# Patient Record
Sex: FEMALE | Race: WHITE | Employment: OTHER | URBAN - NONMETROPOLITAN AREA
[De-identification: names, ages, dates, MRNs, and addresses within clinical notes are randomized per-mention and may not be internally consistent; named-entity substitution may affect disease eponyms.]

---

## 2018-04-24 ENCOUNTER — OFFICE VISIT (OUTPATIENT)
Dept: CARDIOLOGY CLINIC | Age: 83
End: 2018-04-24
Payer: MEDICARE

## 2018-04-24 VITALS — DIASTOLIC BLOOD PRESSURE: 74 MMHG | HEART RATE: 72 BPM | WEIGHT: 139.2 LBS | SYSTOLIC BLOOD PRESSURE: 148 MMHG

## 2018-04-24 DIAGNOSIS — G45.8 OTHER SPECIFIED TRANSIENT CEREBRAL ISCHEMIAS: ICD-10-CM

## 2018-04-24 DIAGNOSIS — I10 ESSENTIAL HYPERTENSION: Primary | ICD-10-CM

## 2018-04-24 DIAGNOSIS — E78.01 FAMILIAL HYPERCHOLESTEROLEMIA: ICD-10-CM

## 2018-04-24 DIAGNOSIS — R06.02 SOB (SHORTNESS OF BREATH): ICD-10-CM

## 2018-04-24 PROCEDURE — 4040F PNEUMOC VAC/ADMIN/RCVD: CPT | Performed by: NUCLEAR MEDICINE

## 2018-04-24 PROCEDURE — G8427 DOCREV CUR MEDS BY ELIG CLIN: HCPCS | Performed by: NUCLEAR MEDICINE

## 2018-04-24 PROCEDURE — 1036F TOBACCO NON-USER: CPT | Performed by: NUCLEAR MEDICINE

## 2018-04-24 PROCEDURE — G8421 BMI NOT CALCULATED: HCPCS | Performed by: NUCLEAR MEDICINE

## 2018-04-24 PROCEDURE — 99204 OFFICE O/P NEW MOD 45 MIN: CPT | Performed by: NUCLEAR MEDICINE

## 2018-04-24 PROCEDURE — G8400 PT W/DXA NO RESULTS DOC: HCPCS | Performed by: NUCLEAR MEDICINE

## 2018-04-24 PROCEDURE — 1123F ACP DISCUSS/DSCN MKR DOCD: CPT | Performed by: NUCLEAR MEDICINE

## 2018-04-24 PROCEDURE — 1090F PRES/ABSN URINE INCON ASSESS: CPT | Performed by: NUCLEAR MEDICINE

## 2018-04-24 RX ORDER — CALCIUM CARBONATE 500(1250)
500 TABLET ORAL DAILY
COMMUNITY

## 2018-04-24 RX ORDER — SUCRALFATE 1 G/1
1 TABLET ORAL 2 TIMES DAILY
COMMUNITY

## 2018-04-24 RX ORDER — MONTELUKAST SODIUM 10 MG/1
10 TABLET ORAL NIGHTLY
COMMUNITY
End: 2018-10-23 | Stop reason: ALTCHOICE

## 2018-04-24 ASSESSMENT — ENCOUNTER SYMPTOMS
PHOTOPHOBIA: 0
BACK PAIN: 0
ABDOMINAL DISTENTION: 0
ANAL BLEEDING: 0
VOMITING: 0
ABDOMINAL PAIN: 0
RECTAL PAIN: 0
BLOOD IN STOOL: 0
SHORTNESS OF BREATH: 1
NAUSEA: 0
CONSTIPATION: 0
DIARRHEA: 0
CHEST TIGHTNESS: 1

## 2018-05-01 ENCOUNTER — TELEPHONE (OUTPATIENT)
Dept: CARDIOLOGY CLINIC | Age: 83
End: 2018-05-01

## 2018-05-01 DIAGNOSIS — R94.39 ABNORMAL STRESS TEST: Primary | ICD-10-CM

## 2018-05-04 DIAGNOSIS — I10 ESSENTIAL HYPERTENSION: ICD-10-CM

## 2018-05-04 DIAGNOSIS — E78.01 FAMILIAL HYPERCHOLESTEROLEMIA: ICD-10-CM

## 2018-05-04 DIAGNOSIS — R06.02 SOB (SHORTNESS OF BREATH): ICD-10-CM

## 2018-05-04 DIAGNOSIS — G45.8 OTHER SPECIFIED TRANSIENT CEREBRAL ISCHEMIAS: ICD-10-CM

## 2018-05-16 ENCOUNTER — PREP FOR PROCEDURE (OUTPATIENT)
Dept: CARDIOLOGY | Age: 83
End: 2018-05-16

## 2018-05-16 RX ORDER — ASPIRIN 325 MG
325 TABLET ORAL ONCE
Status: CANCELLED | OUTPATIENT
Start: 2018-05-16 | End: 2018-05-16

## 2018-05-16 RX ORDER — SODIUM CHLORIDE 9 MG/ML
INJECTION, SOLUTION INTRAVENOUS CONTINUOUS
Status: CANCELLED | OUTPATIENT
Start: 2018-05-16

## 2018-05-16 RX ORDER — SODIUM CHLORIDE 0.9 % (FLUSH) 0.9 %
10 SYRINGE (ML) INJECTION PRN
Status: CANCELLED | OUTPATIENT
Start: 2018-05-16

## 2018-05-16 RX ORDER — DIPHENHYDRAMINE HYDROCHLORIDE 50 MG/ML
50 INJECTION INTRAMUSCULAR; INTRAVENOUS ONCE
Status: CANCELLED | OUTPATIENT
Start: 2018-05-16 | End: 2018-05-16

## 2018-05-16 RX ORDER — NITROGLYCERIN 0.4 MG/1
0.4 TABLET SUBLINGUAL EVERY 5 MIN PRN
Status: CANCELLED | OUTPATIENT
Start: 2018-05-16

## 2018-05-16 RX ORDER — SODIUM CHLORIDE 0.9 % (FLUSH) 0.9 %
10 SYRINGE (ML) INJECTION EVERY 12 HOURS SCHEDULED
Status: CANCELLED | OUTPATIENT
Start: 2018-05-16

## 2018-05-17 ENCOUNTER — HOSPITAL ENCOUNTER (OUTPATIENT)
Dept: INPATIENT UNIT | Age: 83
Discharge: HOME | End: 2018-05-17
Attending: NUCLEAR MEDICINE | Admitting: NUCLEAR MEDICINE
Payer: MEDICARE

## 2018-05-17 VITALS
BODY MASS INDEX: 25.86 KG/M2 | HEIGHT: 61 IN | SYSTOLIC BLOOD PRESSURE: 121 MMHG | RESPIRATION RATE: 16 BRPM | DIASTOLIC BLOOD PRESSURE: 60 MMHG | WEIGHT: 137 LBS | HEART RATE: 75 BPM | OXYGEN SATURATION: 98 % | TEMPERATURE: 98 F

## 2018-05-17 LAB
ABO: NORMAL
ANION GAP SERPL CALCULATED.3IONS-SCNC: 13 MEQ/L (ref 8–16)
ANTIBODY SCREEN: NORMAL
BUN BLDV-MCNC: 18 MG/DL (ref 7–22)
CALCIUM SERPL-MCNC: 9.4 MG/DL (ref 8.5–10.5)
CHLORIDE BLD-SCNC: 98 MEQ/L (ref 98–111)
CO2: 27 MEQ/L (ref 23–33)
CREAT SERPL-MCNC: 0.6 MG/DL (ref 0.4–1.2)
EKG ATRIAL RATE: 61 BPM
EKG P AXIS: 61 DEGREES
EKG P-R INTERVAL: 160 MS
EKG Q-T INTERVAL: 444 MS
EKG QRS DURATION: 90 MS
EKG QTC CALCULATION (BAZETT): 446 MS
EKG R AXIS: 17 DEGREES
EKG T AXIS: 16 DEGREES
EKG VENTRICULAR RATE: 61 BPM
GFR SERPL CREATININE-BSD FRML MDRD: > 90 ML/MIN/1.73M2
GLUCOSE BLD-MCNC: 95 MG/DL (ref 70–108)
HCT VFR BLD CALC: 37.7 % (ref 37–47)
HEMOGLOBIN: 12.8 GM/DL (ref 12–16)
MCH RBC QN AUTO: 29.1 PG (ref 27–31)
MCHC RBC AUTO-ENTMCNC: 34 GM/DL (ref 33–37)
MCV RBC AUTO: 85.6 FL (ref 81–99)
PDW BLD-RTO: 14.3 % (ref 11.5–14.5)
PLATELET # BLD: 205 THOU/MM3 (ref 130–400)
PMV BLD AUTO: 7.8 FL (ref 7.4–10.4)
POTASSIUM REFLEX MAGNESIUM: 3.8 MEQ/L (ref 3.5–5.2)
RBC # BLD: 4.4 MILL/MM3 (ref 4.2–5.4)
RH FACTOR: NORMAL
SODIUM BLD-SCNC: 138 MEQ/L (ref 135–145)
WBC # BLD: 5.7 THOU/MM3 (ref 4.8–10.8)

## 2018-05-17 PROCEDURE — 6360000002 HC RX W HCPCS

## 2018-05-17 PROCEDURE — 2500000003 HC RX 250 WO HCPCS

## 2018-05-17 PROCEDURE — 86900 BLOOD TYPING SEROLOGIC ABO: CPT

## 2018-05-17 PROCEDURE — 93458 L HRT ARTERY/VENTRICLE ANGIO: CPT | Performed by: NUCLEAR MEDICINE

## 2018-05-17 PROCEDURE — 2780000010 HC IMPLANT OTHER

## 2018-05-17 PROCEDURE — 80048 BASIC METABOLIC PNL TOTAL CA: CPT

## 2018-05-17 PROCEDURE — 2720000010 HC SURG SUPPLY STERILE

## 2018-05-17 PROCEDURE — 85027 COMPLETE CBC AUTOMATED: CPT

## 2018-05-17 PROCEDURE — 99152 MOD SED SAME PHYS/QHP 5/>YRS: CPT | Performed by: NUCLEAR MEDICINE

## 2018-05-17 PROCEDURE — 36415 COLL VENOUS BLD VENIPUNCTURE: CPT

## 2018-05-17 PROCEDURE — 2580000003 HC RX 258: Performed by: NURSE PRACTITIONER

## 2018-05-17 PROCEDURE — 86901 BLOOD TYPING SEROLOGIC RH(D): CPT

## 2018-05-17 PROCEDURE — C1894 INTRO/SHEATH, NON-LASER: HCPCS

## 2018-05-17 PROCEDURE — C1769 GUIDE WIRE: HCPCS

## 2018-05-17 PROCEDURE — 93005 ELECTROCARDIOGRAM TRACING: CPT | Performed by: NURSE PRACTITIONER

## 2018-05-17 PROCEDURE — 99153 MOD SED SAME PHYS/QHP EA: CPT | Performed by: NUCLEAR MEDICINE

## 2018-05-17 PROCEDURE — 93010 ELECTROCARDIOGRAM REPORT: CPT | Performed by: INTERNAL MEDICINE

## 2018-05-17 PROCEDURE — 86850 RBC ANTIBODY SCREEN: CPT

## 2018-05-17 RX ORDER — SODIUM CHLORIDE 0.9 % (FLUSH) 0.9 %
10 SYRINGE (ML) INJECTION PRN
Status: DISCONTINUED | OUTPATIENT
Start: 2018-05-17 | End: 2018-05-18 | Stop reason: HOSPADM

## 2018-05-17 RX ORDER — SODIUM CHLORIDE 9 MG/ML
INJECTION, SOLUTION INTRAVENOUS CONTINUOUS
Status: DISCONTINUED | OUTPATIENT
Start: 2018-05-17 | End: 2018-05-17 | Stop reason: SDUPTHER

## 2018-05-17 RX ORDER — FENOFIBRATE 67 MG/1
67 CAPSULE ORAL
COMMUNITY
End: 2018-10-23 | Stop reason: ALTCHOICE

## 2018-05-17 RX ORDER — NITROGLYCERIN 0.4 MG/1
0.4 TABLET SUBLINGUAL EVERY 5 MIN PRN
Status: DISCONTINUED | OUTPATIENT
Start: 2018-05-17 | End: 2018-05-18 | Stop reason: HOSPADM

## 2018-05-17 RX ORDER — SODIUM CHLORIDE 0.9 % (FLUSH) 0.9 %
10 SYRINGE (ML) INJECTION EVERY 12 HOURS SCHEDULED
Status: DISCONTINUED | OUTPATIENT
Start: 2018-05-17 | End: 2018-05-18 | Stop reason: HOSPADM

## 2018-05-17 RX ORDER — ATROPINE SULFATE 0.4 MG/ML
0.5 AMPUL (ML) INJECTION
Status: DISCONTINUED | OUTPATIENT
Start: 2018-05-17 | End: 2018-05-18 | Stop reason: HOSPADM

## 2018-05-17 RX ORDER — SODIUM CHLORIDE 9 MG/ML
INJECTION, SOLUTION INTRAVENOUS CONTINUOUS
Status: DISCONTINUED | OUTPATIENT
Start: 2018-05-17 | End: 2018-05-18 | Stop reason: HOSPADM

## 2018-05-17 RX ORDER — SODIUM CHLORIDE 0.9 % (FLUSH) 0.9 %
10 SYRINGE (ML) INJECTION PRN
Status: DISCONTINUED | OUTPATIENT
Start: 2018-05-17 | End: 2018-05-17 | Stop reason: SDUPTHER

## 2018-05-17 RX ORDER — ASPIRIN 325 MG
325 TABLET ORAL ONCE
Status: DISCONTINUED | OUTPATIENT
Start: 2018-05-17 | End: 2018-05-18 | Stop reason: HOSPADM

## 2018-05-17 RX ORDER — ACETAMINOPHEN 325 MG/1
650 TABLET ORAL EVERY 4 HOURS PRN
Status: DISCONTINUED | OUTPATIENT
Start: 2018-05-17 | End: 2018-05-18 | Stop reason: HOSPADM

## 2018-05-17 RX ORDER — SODIUM CHLORIDE 0.9 % (FLUSH) 0.9 %
10 SYRINGE (ML) INJECTION EVERY 12 HOURS SCHEDULED
Status: DISCONTINUED | OUTPATIENT
Start: 2018-05-17 | End: 2018-05-17

## 2018-05-17 RX ORDER — ONDANSETRON 2 MG/ML
4 INJECTION INTRAMUSCULAR; INTRAVENOUS EVERY 6 HOURS PRN
Status: DISCONTINUED | OUTPATIENT
Start: 2018-05-17 | End: 2018-05-18 | Stop reason: HOSPADM

## 2018-05-17 RX ADMIN — SODIUM CHLORIDE: 9 INJECTION, SOLUTION INTRAVENOUS at 13:44

## 2018-05-17 ASSESSMENT — PAIN SCALES - GENERAL
PAINLEVEL_OUTOF10: 0

## 2018-06-04 ENCOUNTER — TELEPHONE (OUTPATIENT)
Dept: CARDIOLOGY CLINIC | Age: 83
End: 2018-06-04

## 2018-06-04 DIAGNOSIS — I10 HYPERTENSION, UNSPECIFIED TYPE: ICD-10-CM

## 2018-06-04 DIAGNOSIS — E78.5 HYPERLIPIDEMIA, UNSPECIFIED HYPERLIPIDEMIA TYPE: Primary | ICD-10-CM

## 2018-10-23 ENCOUNTER — OFFICE VISIT (OUTPATIENT)
Dept: CARDIOLOGY CLINIC | Age: 83
End: 2018-10-23
Payer: MEDICARE

## 2018-10-23 VITALS
HEART RATE: 64 BPM | SYSTOLIC BLOOD PRESSURE: 138 MMHG | WEIGHT: 139.6 LBS | HEIGHT: 61 IN | DIASTOLIC BLOOD PRESSURE: 70 MMHG | BODY MASS INDEX: 26.36 KG/M2

## 2018-10-23 DIAGNOSIS — I25.10 CORONARY ARTERY DISEASE INVOLVING NATIVE CORONARY ARTERY OF NATIVE HEART WITHOUT ANGINA PECTORIS: Primary | ICD-10-CM

## 2018-10-23 DIAGNOSIS — I10 ESSENTIAL HYPERTENSION: ICD-10-CM

## 2018-10-23 DIAGNOSIS — E78.01 FAMILIAL HYPERCHOLESTEROLEMIA: ICD-10-CM

## 2018-10-23 PROCEDURE — G8598 ASA/ANTIPLAT THER USED: HCPCS | Performed by: NUCLEAR MEDICINE

## 2018-10-23 PROCEDURE — G8400 PT W/DXA NO RESULTS DOC: HCPCS | Performed by: NUCLEAR MEDICINE

## 2018-10-23 PROCEDURE — G8419 CALC BMI OUT NRM PARAM NOF/U: HCPCS | Performed by: NUCLEAR MEDICINE

## 2018-10-23 PROCEDURE — 1036F TOBACCO NON-USER: CPT | Performed by: NUCLEAR MEDICINE

## 2018-10-23 PROCEDURE — G8484 FLU IMMUNIZE NO ADMIN: HCPCS | Performed by: NUCLEAR MEDICINE

## 2018-10-23 PROCEDURE — 1090F PRES/ABSN URINE INCON ASSESS: CPT | Performed by: NUCLEAR MEDICINE

## 2018-10-23 PROCEDURE — 1123F ACP DISCUSS/DSCN MKR DOCD: CPT | Performed by: NUCLEAR MEDICINE

## 2018-10-23 PROCEDURE — 99213 OFFICE O/P EST LOW 20 MIN: CPT | Performed by: NUCLEAR MEDICINE

## 2018-10-23 PROCEDURE — 4040F PNEUMOC VAC/ADMIN/RCVD: CPT | Performed by: NUCLEAR MEDICINE

## 2018-10-23 PROCEDURE — G8427 DOCREV CUR MEDS BY ELIG CLIN: HCPCS | Performed by: NUCLEAR MEDICINE

## 2018-10-23 PROCEDURE — 1101F PT FALLS ASSESS-DOCD LE1/YR: CPT | Performed by: NUCLEAR MEDICINE

## 2018-10-23 RX ORDER — ROSUVASTATIN CALCIUM 5 MG/1
5 TABLET, COATED ORAL
COMMUNITY
Start: 2018-06-08 | End: 2018-10-23 | Stop reason: ALTCHOICE

## 2018-10-23 NOTE — PROGRESS NOTES
[address] [address]  Dept: [phone]  Dept Fax: [phone]  Loc: [phone]    Visit Date: 10/23/2018    Disha Rose is a 81 y.o. female who presents todayfor:  Chief Complaint   Patient presents with    6 Month Follow-Up    Hypertension    Shortness of Breath    Hyperlipidemia     Cath done  Minimal CAD  No chest pain  Aches and pains  Changed to crestor   BP is stable   No cath complications      HPI:  HPI  Past Medical History:   Diagnosis Date    Cerebral artery occlusion with cerebral infarction ([facility] [address]     Chronic kidney disease, stage II (mild)     GERD (gastroesophageal reflux disease)     Hyperlipidemia     Hypertension     Hypokalemia     TIA (transient ischemic attack) 2008    Type II or unspecified type diabetes mellitus without mention of complication, not stated as uncontrolled       Past Surgical History:   Procedure Laterality Date    CHOLECYSTECTOMY      HERNIA REPAIR      HYSTERECTOMY       Family History   Problem Relation Age of Onset    Diabetes Mother      Social History   Substance Use Topics    Smoking status: Former Smoker     Quit date: 12/11/1986    Smokeless tobacco: Never Used    Alcohol use 0.6 oz/week     1 Glasses of wine per week      Comment: daily       Current Outpatient Prescriptions   Medication Sig Dispense Refill    sucralfate (CARAFATE) 1 GM tablet Take 1 g by mouth 2 times daily      calcium carbonate (OSCAL) 500 MG TABS tablet Take 500 mg by mouth daily      Cranberry 1000 MG CAPS Take by mouth      aspirin 81 MG EC tablet Take 81 mg by mouth daily.  omeprazole (PRILOSEC) 40 MG capsule Take 20 mg by mouth daily       hydrochlorothiazide (MICROZIDE) 12.5 MG capsule Take 12.5 mg by mouth every morning       metoprolol (LOPRESSOR) 50 MG tablet Take 50 mg by mouth 2 times daily.  amLODIPine (NORVASC) 5 MG tablet Take 5 mg by mouth daily.      

## 2021-10-11 ENCOUNTER — HOSPITAL ENCOUNTER (OUTPATIENT)
Dept: WOMENS IMAGING | Age: 86
Discharge: HOME | End: 2021-10-11

## 2021-10-11 DIAGNOSIS — Z00.6 ENCOUNTER FOR EXAMINATION FOR NORMAL COMPARISON OR CONTROL IN CLINICAL RESEARCH PROGRAM: ICD-10-CM

## 2021-10-20 ENCOUNTER — HOSPITAL ENCOUNTER (OUTPATIENT)
Dept: WOMENS IMAGING | Age: 86
Discharge: HOME | End: 2021-10-20
Payer: MEDICARE

## 2021-10-20 DIAGNOSIS — N64.52 NIPPLE DISCHARGE: ICD-10-CM

## 2021-10-20 DIAGNOSIS — N64.52 BLOODY DISCHARGE FROM NIPPLE: ICD-10-CM

## 2021-10-20 PROCEDURE — 76642 ULTRASOUND BREAST LIMITED: CPT

## 2021-11-01 ENCOUNTER — HOSPITAL ENCOUNTER (OUTPATIENT)
Dept: WOMENS IMAGING | Age: 86
Discharge: HOME | End: 2021-11-01
Payer: MEDICARE

## 2021-11-01 DIAGNOSIS — N63.42 SUBAREOLAR MASS OF LEFT BREAST: ICD-10-CM

## 2021-11-01 PROCEDURE — C1894 INTRO/SHEATH, NON-LASER: HCPCS

## 2021-11-01 PROCEDURE — 88342 IMHCHEM/IMCYTCHM 1ST ANTB: CPT

## 2021-11-01 PROCEDURE — 88341 IMHCHEM/IMCYTCHM EA ADD ANTB: CPT

## 2021-11-01 PROCEDURE — 77065 DX MAMMO INCL CAD UNI: CPT

## 2021-11-01 PROCEDURE — 88305 TISSUE EXAM BY PATHOLOGIST: CPT

## 2021-11-01 NOTE — PROGRESS NOTES
[facility] [address]  Pre-Biopsy Assessment      Patient Education    Written information about procedure Yes  left X2   Procedural steps explained Yes Ultrasound Biopsy   Post-op potential: bruising, hematoma, pain Yes    Self-care: activity, care of dressing Yes    Patient verbalized understanding Yes    Consent signed and witnessed Yes      Hormone Therapy Status: n/a    Recent Medication: Other, Plavix and 81 mg aspirin Last Dose: 10-29-21                                     Hormone Replacement Therapy: no    Previous Breast Biopsy: no    Previous Diagnosis Cancer: no    Hysterectomy:yes - age 38    Emotional Status: Calm    Language or Physical Barriers: n/a    Comments: n/a      Electronically signed by Claudette Rincon on 11/1/2021 at 1:24 PM

## 2021-11-01 NOTE — PROGRESS NOTES
Breast Biopsy Flowsheet/Post-Operative Care    Date of Procedure: 11/1/2021  Physician: Dr. Byron Choudhury  Technologist: Iris Vicente guided breast biopsy X2  Lesion type: Non-palpable  Breast: left    Clock face position: Site #1: nipple 12:00 barbell clip     Site #2: nipple 12:00 U clilp      Primary Method of Detection:Palpable      Microcalcification's: no   Distribution: N/A        Biopsy Method:   Sertera:    Site # 1    Gauge: 14    # of Passes: 5     Clip: Barbell    Sertera:    Site # 2    Gauge: 14    # of Passes: 4     Clip:  U        Pre-Op Assessment: (BI-RADS)   4. Suspicious Abnormality    Patient Tolerated Procedure: good  Complications: n/a  Comments: n/a    Post Operative Care  Steri strips: Yes  Dressing: Gauze, Tape   Ice Applied to Site:  No  Evidence of Bleeding:  No    Pain Verbalized: No      Written Discharge Instructions: Yes  Condition at Discharge: good  Time of Discharge: 1425    Electronically signed by Claudette Rincon on 11/1/2021 at 3:47 PM

## 2021-11-03 ENCOUNTER — CLINICAL DOCUMENTATION (OUTPATIENT)
Dept: WOMENS IMAGING | Age: 86
End: 2021-11-03

## 2021-11-15 ENCOUNTER — OFFICE VISIT (OUTPATIENT)
Dept: SURGERY | Age: 86
End: 2021-11-15
Payer: MEDICARE

## 2021-11-15 ENCOUNTER — TELEPHONE (OUTPATIENT)
Dept: SURGERY | Age: 86
End: 2021-11-15

## 2021-11-15 VITALS
TEMPERATURE: 97 F | HEIGHT: 61 IN | HEART RATE: 67 BPM | DIASTOLIC BLOOD PRESSURE: 67 MMHG | WEIGHT: 138 LBS | RESPIRATION RATE: 15 BRPM | BODY MASS INDEX: 26.06 KG/M2 | OXYGEN SATURATION: 97 % | SYSTOLIC BLOOD PRESSURE: 122 MMHG

## 2021-11-15 DIAGNOSIS — D24.2 PAPILLOMA OF LEFT BREAST: Primary | ICD-10-CM

## 2021-11-15 DIAGNOSIS — Z01.818 PRE-OP TESTING: ICD-10-CM

## 2021-11-15 PROCEDURE — 1123F ACP DISCUSS/DSCN MKR DOCD: CPT | Performed by: SURGERY

## 2021-11-15 PROCEDURE — G8400 PT W/DXA NO RESULTS DOC: HCPCS | Performed by: SURGERY

## 2021-11-15 PROCEDURE — G8427 DOCREV CUR MEDS BY ELIG CLIN: HCPCS | Performed by: SURGERY

## 2021-11-15 PROCEDURE — 4040F PNEUMOC VAC/ADMIN/RCVD: CPT | Performed by: SURGERY

## 2021-11-15 PROCEDURE — 1090F PRES/ABSN URINE INCON ASSESS: CPT | Performed by: SURGERY

## 2021-11-15 PROCEDURE — G8417 CALC BMI ABV UP PARAM F/U: HCPCS | Performed by: SURGERY

## 2021-11-15 PROCEDURE — 1036F TOBACCO NON-USER: CPT | Performed by: SURGERY

## 2021-11-15 PROCEDURE — 99203 OFFICE O/P NEW LOW 30 MIN: CPT | Performed by: SURGERY

## 2021-11-15 PROCEDURE — G8484 FLU IMMUNIZE NO ADMIN: HCPCS | Performed by: SURGERY

## 2021-11-15 RX ORDER — MULTIVIT WITH MINERALS/LUTEIN
250 TABLET ORAL DAILY
COMMUNITY

## 2021-11-15 RX ORDER — MULTIVIT-MIN/IRON/FOLIC ACID/K 18-600-40
CAPSULE ORAL
Status: ON HOLD | COMMUNITY
End: 2021-12-09

## 2021-11-15 ASSESSMENT — ENCOUNTER SYMPTOMS
CHOKING: 0
COUGH: 0
DIARRHEA: 0
ANAL BLEEDING: 0
PHOTOPHOBIA: 0
EYE PAIN: 0
EYE REDNESS: 0
EYE DISCHARGE: 0
RECTAL PAIN: 0
WHEEZING: 0
VOICE CHANGE: 0
COLOR CHANGE: 0
VOMITING: 0
CONSTIPATION: 0
CHEST TIGHTNESS: 0
SINUS PAIN: 0
SORE THROAT: 0
FACIAL SWELLING: 0
STRIDOR: 0
BLOOD IN STOOL: 0
RHINORRHEA: 0
SINUS PRESSURE: 0
SHORTNESS OF BREATH: 0
EYE ITCHING: 0
ABDOMINAL PAIN: 0
ABDOMINAL DISTENTION: 0
APNEA: 0
NAUSEA: 0
TROUBLE SWALLOWING: 0
BACK PAIN: 0

## 2021-11-15 NOTE — LETTER
[facility]  Wally Delgado MD FACS  Phone- [phone]  Fax [phone]    Pt Name: Celine Blizzard  Medical Record Number: [id number]  Date of Birth 1935   Today's Date: 11/15/2021    Lorna Julian was evaluated in the office today. My assessment and plans are listed below. Assessment:     Elsie Granados was seen today for surgical consult. Diagnoses and all orders for this visit:    Papilloma of left breast  -     US PLACE BREAST LOC DEVICE 1ST LESION LEFT; Future  -     RODGER DIGITAL DIAGNOSTIC W OR WO CAD LEFT; Future  -     EXCISION BREAST LESION W/ PREOP NEEDLE LOC; Future  -     EKG 12 Lead; Future    Pre-op testing  -     EKG 12 Lead; Future         Plan:  1. Schedule Elsie Granados for   1. Ultrasound needle localization of barbell clip  2. Excisional biopsy left breast lesion  2. Status: outpatient  3. Planned anesthesia: MAC  4. We did have a long discussion in the office regarding whether to proceed or not. I did discuss with her that the recommendations for papillary lesions are excision although it is extremely unlikely to find anything further at this site. She had questions about the fact that she is 81 and what the chances were and I did explain to her that certainly from a medical standpoint she does not look 81 and I anticipate that she would live at least 5 years and again the chances are very small that this would be anything or turned to anything. She did then go on to express that she did lose sleep over this since she had a sister who had a bilateral mastectomy she did not want to go to anything like that so after considering this she does want to proceed  5. She will undergo pre-operative clearance per anesthesia guidelines with risk factors listed under the past medical history diagnosis & problem list.  6. Perioperative discontinuation of ASA, Plavix, warfarin, Brillinta, Effient, Pradaxa, Eliquis and Xarelto.  Continuation of 81 mg Aspirin is acceptable. 7. Perioperative medical clearance is not required  Orders Placed This Encounter:  Orders Placed This Encounter   Procedures    EXCISION BREAST LESION W/ PREOP NEEDLE LOC     Standing Status:   Future     Standing Expiration Date:   11/15/2022     Order Specific Question:   Pre-procedure Diagnosis     Answer:   LEFT BREAST MASS    US PLACE BREAST LOC DEVICE 1ST LESION LEFT     Standing Status:   Future     Standing Expiration Date:   11/15/2022    RODGER DIGITAL DIAGNOSTIC W OR WO CAD LEFT     Standing Status:   Future     Standing Expiration Date:   1/15/2023    EKG 12 Lead     Standing Status:   Future     Standing Expiration Date:   11/15/2022     Order Specific Question:   Reason for Exam?     Answer:   Pre-op   8. If I can provide any additional assistance or you have any concerns, please feel free to contact me. Thank you for allowing to participate in the care of your patients. Sincerely,      Evelia Davila MD FACS  [address]  [place], [facility]  Office: [phone]  Fax: [phone]

## 2021-11-15 NOTE — PROGRESS NOTES
Subjective:      Patient ID: Noemi Yang is a 81 y.o. female. Chief Complaint   Patient presents with    Surgical Consult     new patient--ref by [facility] for left breast papilloma-needs excision       HPI  /67 (Site: Right Upper Arm, Position: Sitting, Cuff Size: Medium Adult)   Pulse 67   Temp 97 °F (36.1 °C) (Tympanic)   Resp 15   Ht 5' 1\" (1.549 m)   Wt 138 lb (62.6 kg)   SpO2 97%   BMI 26.07 kg/m²     Review of Systems   Constitutional: Negative for activity change, appetite change, chills, diaphoresis, fatigue, fever and unexpected weight change. HENT: Negative for congestion, dental problem, drooling, ear discharge, ear pain, facial swelling, hearing loss, mouth sores, nosebleeds, postnasal drip, rhinorrhea, sinus pressure, sinus pain, sneezing, sore throat, tinnitus, trouble swallowing and voice change. Eyes: Negative for photophobia, pain, discharge, redness, itching and visual disturbance. Respiratory: Negative for apnea, cough, choking, chest tightness, shortness of breath, wheezing and stridor. Cardiovascular: Negative for chest pain, palpitations and leg swelling. Gastrointestinal: Negative for abdominal distention, abdominal pain, anal bleeding, blood in stool, constipation, diarrhea, nausea, rectal pain and vomiting. Endocrine: Negative for cold intolerance, heat intolerance, polydipsia, polyphagia and polyuria. Genitourinary: Negative for decreased urine volume, difficulty urinating, dyspareunia, dysuria, enuresis, flank pain, frequency, genital sores, hematuria, menstrual problem, pelvic pain, urgency, vaginal bleeding, vaginal discharge and vaginal pain. Musculoskeletal: Negative for arthralgias, back pain, gait problem, joint swelling, myalgias, neck pain and neck stiffness. Skin: Negative for color change, pallor, rash and wound. Allergic/Immunologic: Negative for environmental allergies, food allergies and immunocompromised state.    Neurological: Negative for dizziness, tremors, seizures, syncope, facial asymmetry, speech difficulty, weakness, light-headedness, numbness and headaches. Hematological: Negative for adenopathy. Does not bruise/bleed easily. Psychiatric/Behavioral: Negative for agitation, behavioral problems, confusion, decreased concentration, dysphoric mood, hallucinations, self-injury, sleep disturbance and suicidal ideas. The patient is not nervous/anxious and is not hyperactive.         Objective:   Physical Exam    Assessment:            Plan:              Alisa West

## 2021-11-15 NOTE — TELEPHONE ENCOUNTER
[address]    Phone * [phone]   Surgical Scheduling Direct line Phone * [phone]  Fax * [phone]      Nai Mensahcarolyn      1935    female    [place] [address]   Marital Status:         Home Phone: [phone]   Cell Phone:   Telephone Information:   Mobile [phone]              Surgeon: Dr. Sena Cavazos  Surgery Date:12-   Time: CATALINA David     Procedure: Left Lumpectomy with ultrasound guided preop needle localization  Outpatient     Diagnosis: left breast pappiloma    Important Medical History: In Epic    Special Inst/Equip:     CPT Codes: 38516    Latex Allergy:   no Cardiac Device:  no    Anesthesia Type: MAC    Case Location:  Main OR     Preadmission Testing: Phone Call      PAT Date and Time: ________________________________    PAT Confirmation #: _________________________________    Post Op Visit:  ______________________________________    Need Preop Cardiac Clearance:   no    Does patient have Cardiologist/physician?  No      Surgery Conformation #:  ______________________________________________    : __________________________________ Date:____________________        [employer] Name:  Luis Enrique Stone

## 2021-11-15 NOTE — PROGRESS NOTES
Montserrat Ruff MD [facility]  General Surgery  New Patient Evaluation in Office  Pt Name: Heide Delgado  Date of Birth 1935   Today's Date: 11/15/2021  Medical Record Number: [id number]  Referring Provider: No ref. provider found  Primary Care Provider: Rosalva Augustinnsrodney  Chief Complaint   Patient presents with    Surgical Consult     new patient--ref by [facility] for left breast papilloma-needs excision     ASSESSMENT      Problem List Items Addressed This Visit     Papilloma of left breast - Primary    Relevant Orders    US PLACE BREAST LOC DEVICE 1ST LESION LEFT    RODGER DIGITAL DIAGNOSTIC W OR WO CAD LEFT    EXCISION BREAST LESION W/ PREOP NEEDLE LOC    EKG 12 Lead      Other Visit Diagnoses     Pre-op testing        Relevant Orders    EKG 12 Lead        Past Medical History:   Diagnosis Date    GERD (gastroesophageal reflux disease)     Hiatal hernia     Hyperlipidemia     Hypertension     Hypokalemia     Papilloma of left breast     TIA (transient ischemic attack) 2008          PLANS      1. Schedule Kirk Denver for   1. Ultrasound needle localization of barbell clip  2. Excisional biopsy left breast lesion  2. Status: outpatient  3. Planned anesthesia: MAC  4. We did have a long discussion in the office regarding whether to proceed or not. I did discuss with her that the recommendations for papillary lesions are excision although it is extremely unlikely to find anything further at this site. She had questions about the fact that she is 81 and what the chances were and I did explain to her that certainly from a medical standpoint she does not look 81 and I anticipate that she would live at least 5 years and again the chances are very small that this would be anything or turned to anything. She did then go on to express that she did lose sleep over this since she had a sister who had a bilateral mastectomy she did not want to go to anything like that so after considering this she does want to proceed  5.  She will undergo pre-operative clearance per anesthesia guidelines with risk factors listed under the past medical history diagnosis & problem list.  6. Perioperative discontinuation of ASA, Plavix, warfarin, Brillinta, Effient, Pradaxa, Eliquis and Xarelto. Continuation of 81 mg Aspirin is acceptable. 7. Perioperative medical clearance is not required  Orders Placed This Encounter:  Orders Placed This Encounter   Procedures    EXCISION BREAST LESION W/ PREOP NEEDLE LOC     Standing Status:   Future     Standing Expiration Date:   11/15/2022     Order Specific Question:   Pre-procedure Diagnosis     Answer:   LEFT BREAST MASS    US PLACE BREAST LOC DEVICE 1ST LESION LEFT     Standing Status:   Future     Standing Expiration Date:   11/15/2022    RODGER DIGITAL DIAGNOSTIC W OR WO CAD LEFT     Standing Status:   Future     Standing Expiration Date:   1/15/2023    EKG 12 Lead     Standing Status:   Future     Standing Expiration Date:   11/15/2022     Order Specific Question:   Reason for Exam?     Answer:   Pre-op   8. Leah Garrison is a 81 y.o.  female seen in the office for evaluation of bloody nipple discharge left breast and ultrasound biopsy of papillary lesion. She presented with bloody liquid nipple discharge left breast.  She was referred into women's wellness for a galactogram which could not be completed successfully and an ultrasound was performed which revealed to small retroareolar lesions. Both of these were biopsied and one returned sclerosed intraductal papilloma and the other was just fibrosis. The lesion with the barbell clip is the intraductal papilloma although they are very close. The patient did say that since her biopsy she has had no more nipple discharge  Narrative   LOCATION: [place]       PROCEDURE: US BREAST LIMITED LEFT       CLINICAL INFORMATION: Nipple discharge . Bloody left nipple discharge.       COMPARISON: 10/4/2021 and 5/24/2019.  These are outside images from joint [facility].       Initially, a left breast galactogram was attempted. Informed signed consent was obtained from the patient. The left nipple was prepped and draped in standard sterile fashion. A small amount of bloody discharge could be seen. This is at 12:00 on the    nipple. A 31-gauge cannula was utilized. Despite multiple attempts, the discharging duct could not be cannulated. A galactogram could not be performed.       TECHNIQUE: Targeted ultrasound of the left breast was performed. Grayscale images and color images of the real-time examination were reviewed. The axilla was also imaged.       FINDINGS:        The retroareolar area was imaged. There is a 5 x 2 x 4 mm oval nodule at 12:00 just superior to the nipple. There is no associated color flow. Adjacent to this, there is a 2nd oval hypoechoic nodule measuring 7 x 2 x 7 mm.       There are no sonographic abnormalities in the axilla. A normal lymph node is seen.                Impression   2 retroareolar/upper central left breast nodules. These are posterior to the discharging duct. An ultrasound-guided biopsy of each of these is recommended.       An ultrasound-guided biopsy is recommended.       These results were discussed with the patient. The tech navigator was notified. We will arrange scheduling the patient for her procedure per department protocol.                   BI-RADS CATEGORY 4B - Intermediate suspicion for malignancy           FINAL DIAGNOSIS:   A. Left breast, 12:00, U clip, core biopsy:             Dense fibrous tissue, negative for malignancy. D. Left breast mass, 12:00, barbell clip, core biopsy:             Partially disrupted sclerosed papilloma.  See microscopic    Past Medical History  Past Medical History:   Diagnosis Date    GERD (gastroesophageal reflux disease)     Hiatal hernia     Hyperlipidemia     Hypertension     Hypokalemia     Papilloma of left breast     TIA (transient ischemic attack) 2008 Past Surgical History  Past Surgical History:   Procedure Laterality Date    CHOLECYSTECTOMY  1993    COLONOSCOPY  2010    Dr. Donna Ferrell    age 38   [address] LEFT Left 11/01/2021    RODGER [address] LEFT 11/1/2021 Melody Lowe MD [facility] OVARY REMOVAL Bilateral 1993    age 38    [address] LEFT Left 11/01/2021     BREAST BIOPSY NEEDLE ADDITIONAL LEFT 11/1/2021 Melody Lowe MD [facility]       Medications  Current Outpatient Medications on File Prior to Visit   Medication Sig Dispense Refill    Ascorbic Acid (VITAMIN C) 250 MG tablet Take 250 mg by mouth daily      Cholecalciferol (VITAMIN D) 50 MCG (2000 UT) CAPS capsule Take by mouth      sucralfate (CARAFATE) 1 GM tablet Take 1 g by mouth 2 times daily      calcium carbonate (OSCAL) 500 MG TABS tablet Take 500 mg by mouth daily      aspirin 81 MG EC tablet Take 81 mg by mouth daily.  omeprazole (PRILOSEC) 40 MG capsule Take 20 mg by mouth daily       hydrochlorothiazide (MICROZIDE) 12.5 MG capsule Take 12.5 mg by mouth every morning       metoprolol (LOPRESSOR) 50 MG tablet Take 50 mg by mouth 2 times daily.  amLODIPine (NORVASC) 5 MG tablet Take 5 mg by mouth daily.  clopidogrel (PLAVIX) 75 MG tablet Take 75 mg by mouth daily.  Multiple Vitamins-Minerals (MULTIVITAMIN PO) Take  by mouth daily.  Cranberry 1000 MG CAPS Take by mouth (Patient not taking: Reported on 11/15/2021)       No current facility-administered medications on file prior to visit.      Allergies  Allergies   Allergen Reactions    Bicillin [Penicillin G Benzathine]        Family History  Family History   Problem Relation Age of Onset    Diabetes Mother     Cancer Mother         unsure of primary    Breast Cancer Daughter 62        bilateral    Villarreal Cancer Maternal Grandmother         female cancer mets to liver Social History  Social History     Socioeconomic History    Marital status:      Spouse name: Not on file    Number of children: Not on file    Years of education: Not on file    Highest education level: Not on file   Occupational History    Not on file   Tobacco Use    Smoking status: Former Smoker     Quit date: 1986     Years since quittin.9    Smokeless tobacco: Never Used   Vaping Use    Vaping Use: Never used   Substance and Sexual Activity    Alcohol use: Yes     Alcohol/week: 1.0 standard drink     Types: 1 Glasses of wine per week     Comment: daily     Drug use: No    Sexual activity: Not on file   Other Topics Concern    Not on file   Social History Narrative    Not on file     Social Determinants of Health     Financial Resource Strain:     Difficulty of Paying Living Expenses: Not on file   Food Insecurity:     Worried About Running Out of Food in the Last Year: Not on file    Kevin of Food in the Last Year: Not on file   Transportation Needs:     Lack of Transportation (Medical): Not on file    Lack of Transportation (Non-Medical):  Not on file   Physical Activity:     Days of Exercise per Week: Not on file    Minutes of Exercise per Session: Not on file   Stress:     Feeling of Stress : Not on file   Social Connections:     Frequency of Communication with Friends and Family: Not on file    Frequency of Social Gatherings with Friends and Family: Not on file    Attends Jainism Services: Not on file    Active Member of Clubs or Organizations: Not on file    Attends Club or Organization Meetings: Not on file    Marital Status: Not on file   Intimate Partner Violence:     Fear of Current or Ex-Partner: Not on file    Emotionally Abused: Not on file    Physically Abused: Not on file    Sexually Abused: Not on file   Housing Stability:     Unable to Pay for Housing in the Last Year: Not on file    Number of [place] in the Last Year: Not on file    Unstable Housing in the Last Year: Not on file       [address] Maintenance   Topic Date Due    COVID-19 Vaccine (1) Never done    DTaP/Tdap/Td vaccine (1 - Tdap) Never done    DEXA (modify frequency per FRAX score)  Never done    Shingles Vaccine (2 of 3) 09/28/2017    Potassium monitoring  05/17/2019    Creatinine monitoring  05/17/2019    Annual Wellness Visit (AWV)  Never done    Flu vaccine (1) 09/01/2021    Pneumococcal 65+ years Vaccine  Completed    Hepatitis A vaccine  Aged Out    Hepatitis B vaccine  Aged Out    Hib vaccine  Aged Out    Meningococcal (ACWY) vaccine  Aged Out       Review of Systems  Constitutional: Negative for activity change, appetite change, chills, diaphoresis, fatigue, fever and unexpected weight change. HENT: Negative for congestion, dental problem, drooling, ear discharge, ear pain, facial swelling, hearing loss, mouth sores, nosebleeds, postnasal drip, rhinorrhea, sinus pressure, sinus pain, sneezing, sore throat, tinnitus, trouble swallowing and voice change. Eyes: Negative for photophobia, pain, discharge, redness, itching and visual disturbance. Respiratory: Negative for apnea, cough, choking, chest tightness, shortness of breath, wheezing and stridor. Cardiovascular: Negative for chest pain, palpitations and leg swelling. Gastrointestinal: Negative for abdominal distention, abdominal pain, anal bleeding, blood in stool, constipation, diarrhea, nausea, rectal pain and vomiting. Endocrine: Negative for cold intolerance, heat intolerance, polydipsia, polyphagia and polyuria. Genitourinary: Negative for decreased urine volume, difficulty urinating, dyspareunia, dysuria, enuresis, flank pain, frequency, genital sores, hematuria, menstrual problem, pelvic pain, urgency, vaginal bleeding, vaginal discharge and vaginal pain.    Musculoskeletal: Negative for arthralgias, back pain, gait problem, joint swelling, myalgias, neck pain and neck Normal S1 and S2. . Carotid and femoral pulses 2+/4 and equal bilaterally. ABDOMEN: Normal shape. Hysterectomy and cholecystectomy scar(s) present. Normal bowel sounds. No bruits. Elizabeth Raddle \"soft, nontender, nondistended, no masses or organomegaly. no evidence of hernia. Percussion: Normal without hepatosplenomegally. Tenderness: absent. RECTAL: deferred, not clinically indicated  NEUROLOGIC: There are no focalizing motor or sensory deficits. CN II-XII are grossly intact. [facility] EXTREMITIES: no cyanosis, no clubbing and no edema.                 Electronically signed by Danika Britt MD on 11/15/2021 at 5:04 PM

## 2021-11-15 NOTE — TELEPHONE ENCOUNTER
Patient scheduled for surgery with Dr. Ranulfo Park on 12- at 11:30am with an arrival of 8:30am at [address] [place] [address]. Preop orders and instructions given to patient. Surgery consent signed. Antibacterial soap given. Patient having EKG done at [facility]. Okay to hold Plavix 5-day per [facility] at Dr. Alfred Wolf office.

## 2021-11-16 ENCOUNTER — PREP FOR PROCEDURE (OUTPATIENT)
Dept: SURGERY | Age: 86
End: 2021-11-16

## 2021-12-08 NOTE — H&P
Obinna Vieyra MD [facility]  General Surgery  H and P for Surgery   Pt Name: Odilon Metcalf  Date of Birth 1935   Today's Date: 12/8/2021  Medical Record Number: [id number]  Referring Provider: No ref. provider found  Primary Care Provider: Leonides Paige  No chief complaint on file. ASSESSMENT      Problem List Items Addressed This Visit     Patient Active Problem List   Diagnosis Code    Chronic kidney disease, stage II (mild) N18.2    Hyperlipidemia E78.5    Hypertension I10    Hypokalemia E87.6    Proteinuria R80.9    Abnormal stress test R94.39    Papilloma of left breast D24.2            Past Medical History:   Diagnosis Date    GERD (gastroesophageal reflux disease)     Hiatal hernia     Hyperlipidemia     Hypertension     Hypokalemia     Papilloma of left breast     TIA (transient ischemic attack) 2008          PLANS      1. Schedule Lisfiona Purdyk for   1. Ultrasound needle localization of barbell clip  2. Excisional biopsy left breast lesion  2. Status: outpatient  3. Planned anesthesia: MAC  4. We did have a long discussion in the office regarding whether to proceed or not. I did discuss with her that the recommendations for papillary lesions are excision although it is extremely unlikely to find anything further at this site. She had questions about the fact that she is 81 and what the chances were and I did explain to her that certainly from a medical standpoint she does not look 81 and I anticipate that she would live at least 5 years and again the chances are very small that this would be anything or turned to anything. She did then go on to express that she did lose sleep over this since she had a sister who had a bilateral mastectomy she did not want to go to anything like that so after considering this she does want to proceed  5.  She will undergo pre-operative clearance per anesthesia guidelines with risk factors listed under the past medical history diagnosis & problem list.  6. Perioperative discontinuation of ASA, Plavix, warfarin, Brillinta, Effient, Pradaxa, Eliquis and Xarelto. Continuation of 81 mg Aspirin is acceptable. 7. Perioperative medical clearance is not required  Orders Placed This Encounter:  No orders of the defined types were placed in this encounter. 8.       JEFE Rogers is a 81 y.o.  female seen in the office for evaluation of bloody nipple discharge left breast and ultrasound biopsy of papillary lesion. She presented with bloody liquid nipple discharge left breast.  She was referred into women's wellness for a galactogram which could not be completed successfully and an ultrasound was performed which revealed to small retroareolar lesions. Both of these were biopsied and one returned sclerosed intraductal papilloma and the other was just fibrosis. The lesion with the barbell clip is the intraductal papilloma although they are very close. The patient did say that since her biopsy she has had no more nipple discharge  Narrative   LOCATION: [place]       PROCEDURE: US BREAST LIMITED LEFT       CLINICAL INFORMATION: Nipple discharge . Bloody left nipple discharge.       COMPARISON: 10/4/2021 and 5/24/2019. These are outside images from [facility].       Initially, a left breast galactogram was attempted. Informed signed consent was obtained from the patient. The left nipple was prepped and draped in standard sterile fashion. A small amount of bloody discharge could be seen. This is at 12:00 on the    nipple. A 31-gauge cannula was utilized. Despite multiple attempts, the discharging duct could not be cannulated. A galactogram could not be performed.       TECHNIQUE: Targeted ultrasound of the left breast was performed. Grayscale images and color images of the real-time examination were reviewed. The axilla was also imaged.       FINDINGS:        The retroareolar area was imaged.  There is a 5 x 2 x 4 mm oval nodule at 12:00 just superior to the nipple. There is no associated color flow. Adjacent to this, there is a 2nd oval hypoechoic nodule measuring 7 x 2 x 7 mm.       There are no sonographic abnormalities in the axilla. A normal lymph node is seen.                Impression   2 retroareolar/upper central left breast nodules. These are posterior to the discharging duct. An ultrasound-guided biopsy of each of these is recommended.       An ultrasound-guided biopsy is recommended.       These results were discussed with the patient. The tech navigator was notified. We will arrange scheduling the patient for her procedure per department protocol.                   BI-RADS CATEGORY 4B - Intermediate suspicion for malignancy           FINAL DIAGNOSIS:   A. Left breast, 12:00, U clip, core biopsy:             Dense fibrous tissue, negative for malignancy. D. Left breast mass, 12:00, barbell clip, core biopsy:             Partially disrupted sclerosed papilloma. See microscopic    Past Medical History  Past Medical History:   Diagnosis Date    GERD (gastroesophageal reflux disease)     Hiatal hernia     Hyperlipidemia     Hypertension     Hypokalemia     Papilloma of left breast     TIA (transient ischemic attack) 2008       Past Surgical History  Past Surgical History:   Procedure Laterality Date   Sybil Jefferson  2010    Dr. Seth Laboy    age 38    RODGER US GUID NDL BIOPSY LEFT Left 11/01/2021    RODGER [address] LEFT 11/1/2021 MD Moody Da Silva    OVARY REMOVAL Bilateral 1993    age 38    [address] LEFT Left 11/01/2021    US BREAST BIOPSY NEEDLE ADDITIONAL LEFT 11/1/2021 MD Moody Da Silva       Medications  No current facility-administered medications on file prior to encounter.      Current Outpatient Medications on File Prior to Encounter   Medication Sig Dispense Refill    Ascorbic Acid (VITAMIN C) 250 MG tablet Take 250 mg by mouth daily      Cholecalciferol (VITAMIN D) 50 MCG ( UT) CAPS capsule Take by mouth      sucralfate (CARAFATE) 1 GM tablet Take 1 g by mouth 2 times daily      calcium carbonate (OSCAL) 500 MG TABS tablet Take 500 mg by mouth daily      Cranberry 1000 MG CAPS Take by mouth (Patient not taking: Reported on 11/15/2021)      aspirin 81 MG EC tablet Take 81 mg by mouth daily.  omeprazole (PRILOSEC) 40 MG capsule Take 20 mg by mouth daily       hydrochlorothiazide (MICROZIDE) 12.5 MG capsule Take 12.5 mg by mouth every morning       metoprolol (LOPRESSOR) 50 MG tablet Take 50 mg by mouth 2 times daily.  amLODIPine (NORVASC) 5 MG tablet Take 5 mg by mouth daily.  clopidogrel (PLAVIX) 75 MG tablet Take 75 mg by mouth daily.  Multiple Vitamins-Minerals (MULTIVITAMIN PO) Take  by mouth daily. Allergies  Allergies   Allergen Reactions    Bicillin [Penicillin G Benzathine]        Family History  Family History   Problem Relation Age of Onset    Diabetes Mother     Cancer Mother         unsure of primary    Breast Cancer Daughter 62        bilateral     Cancer Maternal Grandmother         female cancer mets to liver       Social History  Social History     Socioeconomic History    Marital status:      Spouse name: Not on file    Number of children: Not on file    Years of education: Not on file    Highest education level: Not on file   Occupational History    Not on file   Tobacco Use    Smoking status: Former Smoker     Quit date: 1986     Years since quittin.0    Smokeless tobacco: Never Used   Vaping Use    Vaping Use: Never used   Substance and Sexual Activity    Alcohol use:  Yes     Alcohol/week: 1.0 standard drink     Types: 1 Glasses of wine per week     Comment: daily     Drug use: No    Sexual activity: Not on file   Other Topics Concern    Not on file   Social History Narrative    Not on file     Social Determinants of Health     Financial Resource Strain:     Difficulty of Paying Living Expenses: Not on file   Food Insecurity:     Worried About Running Out of Food in the Last Year: Not on file    Kevin of Food in the Last Year: Not on file   Transportation Needs:     Lack of Transportation (Medical): Not on file    Lack of Transportation (Non-Medical):  Not on file   Physical Activity:     Days of Exercise per Week: Not on file    Minutes of Exercise per Session: Not on file   Stress:     Feeling of Stress : Not on file   Social Connections:     Frequency of Communication with Friends and Family: Not on file    Frequency of Social Gatherings with Friends and Family: Not on file    Attends Sikh Services: Not on file    Active Member of [address] [place] [address] [employer] or Organizations: Not on file    Attends Club or Organization Meetings: Not on file    Marital Status: Not on file   Intimate Partner Violence:     Fear of Current or Ex-Partner: Not on file    Emotionally Abused: Not on file    Physically Abused: Not on file    Sexually Abused: Not on file   Housing Stability:     Unable to Pay for Housing in the Last Year: Not on file    Number of [place] in the Last Year: Not on file    Unstable Housing in the Last Year: Not on [address] [address] Maintenance   Topic Date Due    COVID-19 Vaccine (1) Never done    DTaP/Tdap/Td vaccine (1 - Tdap) Never done    DEXA (modify frequency per FRAX score)  Never done    Shingles Vaccine (2 of 3) 09/28/2017    Potassium monitoring  05/17/2019    Creatinine monitoring  05/17/2019    Annual Wellness Visit (AWV)  Never done    Flu vaccine (1) 09/01/2021    Pneumococcal 65+ years Vaccine  Completed    Hepatitis A vaccine  Aged Out    Hepatitis B vaccine  Aged Out    Hib vaccine  Aged Out    Meningococcal (ACWY) vaccine  Aged Out       Review of Systems  Constitutional: Negative for activity change, appetite change, chills, diaphoresis, fatigue, fever and unexpected weight change. HENT: Negative for congestion, dental problem, drooling, ear discharge, ear pain, facial swelling, hearing loss, mouth sores, nosebleeds, postnasal drip, rhinorrhea, sinus pressure, sinus pain, sneezing, sore throat, tinnitus, trouble swallowing and voice change. Eyes: Negative for photophobia, pain, discharge, redness, itching and visual disturbance. Respiratory: Negative for apnea, cough, choking, chest tightness, shortness of breath, wheezing and stridor. Cardiovascular: Negative for chest pain, palpitations and leg swelling. Gastrointestinal: Negative for abdominal distention, abdominal pain, anal bleeding, blood in stool, constipation, diarrhea, nausea, rectal pain and vomiting. Endocrine: Negative for cold intolerance, heat intolerance, polydipsia, polyphagia and polyuria. Genitourinary: Negative for decreased urine volume, difficulty urinating, dyspareunia, dysuria, enuresis, flank pain, frequency, genital sores, hematuria, menstrual problem, pelvic pain, urgency, vaginal bleeding, vaginal discharge and vaginal pain. Musculoskeletal: Negative for arthralgias, back pain, gait problem, joint swelling, myalgias, neck pain and neck stiffness. Skin: Negative for color change, pallor, rash and wound. Allergic/Immunologic: Negative for environmental allergies, food allergies and immunocompromised state. Neurological: Negative for dizziness, tremors, seizures, syncope, facial asymmetry, speech difficulty, weakness, light-headedness, numbness and headaches. Hematological: Negative for adenopathy. Does not bruise/bleed easily. Psychiatric/Behavioral: Negative for agitation, behavioral problems, confusion, decreased concentration, dysphoric mood, hallucinations, self-injury, sleep disturbance and suicidal ideas.  The patient is not nervous/anxious and is not hyperactive    OBJECTIVE    VITALS:  vitals were not taken for this visit. CONSTITUTIONAL: Alert and oriented times 3, no acute distress and cooperative to examination with proper mood and affect. SKIN: Skin color, texture, turgor normal. No rashes or lesions. LYMPH: no cervical nodes, no supraclavicular nodes, no axillary nodes  HEENT: Head is normocephalic, atraumatic. EOMI, PERRLA. NECK: Supple, symmetrical, trachea midline, no adenopathy, thyroid symmetric, not enlarged and no tenderness, skin normal.   BREAST: Exam the left breast first at 230 and 3 o'clock position same since from the nipple there are 2 biopsy sites located. There are circumareolar ecchymosis mild some may be palpable thickening at the 12 o'clock position but no dominant masses can be felt no nipple discharge is elicited. Right breast is unremarkable to exam  CHEST/LUNGS: chest symmetric with normal A/P diameter, normal respiratory rate and rhythm, lungs clear to auscultation without wheezes, rales or rhonchi. No accessory muscle use. Scars None   CARDIOVASCULAR: Heart sounds are normal.  Regular rate and rhythm without murmur, gallop or rub. Normal S1 and S2. . Carotid and femoral pulses 2+/4 and equal bilaterally. ABDOMEN: Normal shape. Hysterectomy and cholecystectomy scar(s) present. Normal bowel sounds. No bruits. Rhenda Sylvia \"soft, nontender, nondistended, no masses or organomegaly. no evidence of hernia. Percussion: Normal without hepatosplenomegally. Tenderness: absent. RECTAL: deferred, not clinically indicated  NEUROLOGIC: There are no focalizing motor or sensory deficits. CN II-XII are grossly intact. Rhenda Sylvia EXTREMITIES: no cyanosis, no clubbing and no edema.                 Electronically signed by Jean-Pierre Mena MD on 12/8/2021 at 6:54 AM

## 2021-12-09 ENCOUNTER — HOSPITAL ENCOUNTER (OUTPATIENT)
Age: 86
Setting detail: OUTPATIENT SURGERY
Discharge: HOME | End: 2021-12-09
Attending: SURGERY | Admitting: SURGERY
Payer: MEDICARE

## 2021-12-09 ENCOUNTER — ANESTHESIA (OUTPATIENT)
Dept: OPERATING ROOM | Age: 86
End: 2021-12-09
Payer: MEDICARE

## 2021-12-09 ENCOUNTER — HOSPITAL ENCOUNTER (OUTPATIENT)
Dept: WOMENS IMAGING | Age: 86
Discharge: HOME | End: 2021-12-09
Attending: SURGERY
Payer: MEDICARE

## 2021-12-09 ENCOUNTER — HOSPITAL ENCOUNTER (OUTPATIENT)
Dept: WOMENS IMAGING | Age: 86
Discharge: HOME | End: 2021-12-09
Payer: MEDICARE

## 2021-12-09 ENCOUNTER — ANESTHESIA EVENT (OUTPATIENT)
Dept: OPERATING ROOM | Age: 86
End: 2021-12-09
Payer: MEDICARE

## 2021-12-09 VITALS
RESPIRATION RATE: 16 BRPM | OXYGEN SATURATION: 93 % | HEIGHT: 61 IN | BODY MASS INDEX: 26.43 KG/M2 | HEART RATE: 75 BPM | WEIGHT: 140 LBS | DIASTOLIC BLOOD PRESSURE: 60 MMHG | TEMPERATURE: 96.9 F | SYSTOLIC BLOOD PRESSURE: 133 MMHG

## 2021-12-09 VITALS — OXYGEN SATURATION: 99 % | DIASTOLIC BLOOD PRESSURE: 71 MMHG | SYSTOLIC BLOOD PRESSURE: 159 MMHG

## 2021-12-09 DIAGNOSIS — D24.2 PAPILLOMA OF LEFT BREAST: ICD-10-CM

## 2021-12-09 DIAGNOSIS — Z98.890 S/P LEFT BREAST BIOPSY: Primary | ICD-10-CM

## 2021-12-09 LAB — POTASSIUM SERPL-SCNC: 3.6 MEQ/L (ref 3.5–5.2)

## 2021-12-09 PROCEDURE — 7100000010 HC PHASE II RECOVERY - FIRST 15 MIN: Performed by: SURGERY

## 2021-12-09 PROCEDURE — 88307 TISSUE EXAM BY PATHOLOGIST: CPT

## 2021-12-09 PROCEDURE — 2580000003 HC RX 258: Performed by: SURGERY

## 2021-12-09 PROCEDURE — 36415 COLL VENOUS BLD VENIPUNCTURE: CPT

## 2021-12-09 PROCEDURE — 19125 EXCISION BREAST LESION: CPT | Performed by: SURGERY

## 2021-12-09 PROCEDURE — 2709999900 HC NON-CHARGEABLE SUPPLY: Performed by: SURGERY

## 2021-12-09 PROCEDURE — 6370000000 HC RX 637 (ALT 250 FOR IP): Performed by: SURGERY

## 2021-12-09 PROCEDURE — 7100000011 HC PHASE II RECOVERY - ADDTL 15 MIN: Performed by: SURGERY

## 2021-12-09 PROCEDURE — 3700000001 HC ADD 15 MINUTES (ANESTHESIA): Performed by: SURGERY

## 2021-12-09 PROCEDURE — 2500000003 HC RX 250 WO HCPCS: Performed by: SURGERY

## 2021-12-09 PROCEDURE — 84132 ASSAY OF SERUM POTASSIUM: CPT

## 2021-12-09 PROCEDURE — 19285 PERQ DEV BREAST 1ST US IMAG: CPT

## 2021-12-09 PROCEDURE — 3600000003 HC SURGERY LEVEL 3 BASE: Performed by: SURGERY

## 2021-12-09 PROCEDURE — 6360000002 HC RX W HCPCS: Performed by: SURGERY

## 2021-12-09 PROCEDURE — 77065 DX MAMMO INCL CAD UNI: CPT

## 2021-12-09 PROCEDURE — 3600000013 HC SURGERY LEVEL 3 ADDTL 15MIN: Performed by: SURGERY

## 2021-12-09 PROCEDURE — 6360000002 HC RX W HCPCS: Performed by: REGISTERED NURSE

## 2021-12-09 PROCEDURE — 76098 X-RAY EXAM SURGICAL SPECIMEN: CPT

## 2021-12-09 PROCEDURE — 3700000000 HC ANESTHESIA ATTENDED CARE: Performed by: SURGERY

## 2021-12-09 RX ORDER — SODIUM CHLORIDE 0.9 % (FLUSH) 0.9 %
5-40 SYRINGE (ML) INJECTION EVERY 12 HOURS SCHEDULED
Status: DISCONTINUED | OUTPATIENT
Start: 2021-12-09 | End: 2021-12-09 | Stop reason: HOSPADM

## 2021-12-09 RX ORDER — FENTANYL CITRATE 50 UG/ML
INJECTION, SOLUTION INTRAMUSCULAR; INTRAVENOUS PRN
Status: DISCONTINUED | OUTPATIENT
Start: 2021-12-09 | End: 2021-12-09 | Stop reason: SDUPTHER

## 2021-12-09 RX ORDER — MIDAZOLAM HYDROCHLORIDE 1 MG/ML
INJECTION INTRAMUSCULAR; INTRAVENOUS PRN
Status: DISCONTINUED | OUTPATIENT
Start: 2021-12-09 | End: 2021-12-09 | Stop reason: SDUPTHER

## 2021-12-09 RX ORDER — SODIUM CHLORIDE 9 MG/ML
25 INJECTION, SOLUTION INTRAVENOUS PRN
Status: DISCONTINUED | OUTPATIENT
Start: 2021-12-09 | End: 2021-12-09 | Stop reason: HOSPADM

## 2021-12-09 RX ORDER — HYDROCODONE BITARTRATE AND ACETAMINOPHEN 5; 325 MG/1; MG/1
1 TABLET ORAL EVERY 6 HOURS PRN
Qty: 12 TABLET | Refills: 0 | Status: SHIPPED | OUTPATIENT
Start: 2021-12-09 | End: 2021-12-12

## 2021-12-09 RX ORDER — SODIUM CHLORIDE 9 MG/ML
INJECTION, SOLUTION INTRAVENOUS CONTINUOUS
Status: DISCONTINUED | OUTPATIENT
Start: 2021-12-09 | End: 2021-12-09 | Stop reason: HOSPADM

## 2021-12-09 RX ORDER — SODIUM CHLORIDE 0.9 % (FLUSH) 0.9 %
5-40 SYRINGE (ML) INJECTION PRN
Status: DISCONTINUED | OUTPATIENT
Start: 2021-12-09 | End: 2021-12-09 | Stop reason: HOSPADM

## 2021-12-09 RX ORDER — HYDROCODONE BITARTRATE AND ACETAMINOPHEN 5; 325 MG/1; MG/1
2 TABLET ORAL EVERY 4 HOURS PRN
Status: DISCONTINUED | OUTPATIENT
Start: 2021-12-09 | End: 2021-12-09 | Stop reason: HOSPADM

## 2021-12-09 RX ORDER — HYDROCODONE BITARTRATE AND ACETAMINOPHEN 5; 325 MG/1; MG/1
1 TABLET ORAL EVERY 4 HOURS PRN
Status: DISCONTINUED | OUTPATIENT
Start: 2021-12-09 | End: 2021-12-09 | Stop reason: HOSPADM

## 2021-12-09 RX ORDER — IBUPROFEN 400 MG/1
400 TABLET ORAL ONCE
Status: COMPLETED | OUTPATIENT
Start: 2021-12-09 | End: 2021-12-09

## 2021-12-09 RX ORDER — ACETAMINOPHEN 500 MG
1000 TABLET ORAL ONCE
Status: COMPLETED | OUTPATIENT
Start: 2021-12-09 | End: 2021-12-09

## 2021-12-09 RX ORDER — PROPOFOL 10 MG/ML
INJECTION, EMULSION INTRAVENOUS PRN
Status: DISCONTINUED | OUTPATIENT
Start: 2021-12-09 | End: 2021-12-09 | Stop reason: SDUPTHER

## 2021-12-09 RX ORDER — DEXAMETHASONE SODIUM PHOSPHATE 10 MG/ML
INJECTION, EMULSION INTRAMUSCULAR; INTRAVENOUS PRN
Status: DISCONTINUED | OUTPATIENT
Start: 2021-12-09 | End: 2021-12-09 | Stop reason: SDUPTHER

## 2021-12-09 RX ADMIN — SODIUM CHLORIDE: 9 INJECTION, SOLUTION INTRAVENOUS at 10:39

## 2021-12-09 RX ADMIN — ACETAMINOPHEN 1000 MG: 500 TABLET ORAL at 10:43

## 2021-12-09 RX ADMIN — IBUPROFEN 400 MG: 400 TABLET, FILM COATED ORAL at 10:43

## 2021-12-09 RX ADMIN — PROPOFOL 80 MCG/KG/MIN: 10 INJECTION, EMULSION INTRAVENOUS at 11:14

## 2021-12-09 RX ADMIN — FENTANYL CITRATE 100 MCG: 50 INJECTION, SOLUTION INTRAMUSCULAR; INTRAVENOUS at 11:12

## 2021-12-09 RX ADMIN — DEXAMETHASONE SODIUM PHOSPHATE 10 MG: 10 INJECTION, EMULSION INTRAMUSCULAR; INTRAVENOUS at 11:14

## 2021-12-09 RX ADMIN — CEFAZOLIN 2000 MG: 10 INJECTION, POWDER, FOR SOLUTION INTRAVENOUS at 11:18

## 2021-12-09 RX ADMIN — MIDAZOLAM 1 MG: 1 INJECTION INTRAMUSCULAR; INTRAVENOUS at 11:12

## 2021-12-09 RX ADMIN — PROPOFOL 50 MG: 10 INJECTION, EMULSION INTRAVENOUS at 11:12

## 2021-12-09 ASSESSMENT — PULMONARY FUNCTION TESTS
PIF_VALUE: 0

## 2021-12-09 ASSESSMENT — PAIN SCALES - GENERAL
PAINLEVEL_OUTOF10: 0

## 2021-12-09 ASSESSMENT — PAIN - FUNCTIONAL ASSESSMENT: PAIN_FUNCTIONAL_ASSESSMENT: 0-10

## 2021-12-09 NOTE — ANESTHESIA PRE PROCEDURE
Department of Anesthesiology  Preprocedure Note       Name:  Jaret Sifuentes   Age:  81 y.o.  :  1935                                          MRN:  [id number]         Date:  2021      Surgeon: Curly Joyce):  Margarita Domínguez MD    Procedure: Procedure(s):  LEFT LUMPECTOMY WITH ULTRASOUND GUIDED PREOP NEEDLE LOCALIZATION    Medications prior to admission:   Prior to Admission medications    Medication Sig Start Date End Date Taking? Authorizing Provider   Ascorbic Acid (VITAMIN C) 250 MG tablet Take 250 mg by mouth daily   Yes Historical Provider, MD   sucralfate (CARAFATE) 1 GM tablet Take 1 g by mouth 2 times daily   Yes Historical Provider, MD   calcium carbonate (OSCAL) 500 MG TABS tablet Take 500 mg by mouth daily   Yes Historical Provider, MD   omeprazole (PRILOSEC) 40 MG capsule Take 20 mg by mouth daily    Yes Historical Provider, MD   hydrochlorothiazide (MICROZIDE) 12.5 MG capsule Take 12.5 mg by mouth every morning    Yes Historical Provider, MD   metoprolol (LOPRESSOR) 50 MG tablet Take 50 mg by mouth 2 times daily. Yes Historical Provider, MD   amLODIPine (NORVASC) 5 MG tablet Take 5 mg by mouth daily. Yes Historical Provider, MD   Multiple Vitamins-Minerals (MULTIVITAMIN PO) Take  by mouth daily. Yes Historical Provider, MD   aspirin 81 MG EC tablet Take 81 mg by mouth daily. Historical Provider, MD   clopidogrel (PLAVIX) 75 MG tablet Take 75 mg by mouth daily.     Historical Provider, MD       Current medications:    Current Facility-Administered Medications   Medication Dose Route Frequency Provider Last Rate Last Admin    0.9 % sodium chloride infusion   IntraVENous Continuous Margarita Domínguez  mL/hr at 21 1039 New Bag at 21 1039    sodium chloride flush 0.9 % injection 5-40 mL  5-40 mL IntraVENous 2 times per day Margarita Domínguez MD        sodium chloride flush 0.9 % injection 5-40 mL  5-40 mL IntraVENous PRN Margarita Domínguez MD        0.9 % sodium chloride infusion  25 mL IntraVENous PRN Yoli Srinivasan MD        ceFAZolin (ANCEF) 2000 mg in dextrose 5 % 50 mL IVPB  2,000 mg IntraVENous On Call to MD Velma        bupivacaine (PF) (MARCAINE) 30 mL, lidocaine PF 1 % 30 mL, sodium bicarbonate 1 mL    PRN Yoli Srinivasan MD   Given at 21 1031       Allergies: Allergies   Allergen Reactions    Bicillin [Penicillin G Benzathine]        Problem List:    Patient Active Problem List   Diagnosis Code    Chronic kidney disease, stage II (mild) N18.2    Hyperlipidemia E78.5    Hypertension I10    Hypokalemia E87.6    Proteinuria R80.9    Abnormal stress test R94.39    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 Rodriguez Slider    age 38    RODGER [address] [facility] [address] Left 2021    [facility] GUID [address] LEFT 2021 Tyler Crocker MD [address] [place] [address] Bilateral 1993    age 38    [address] [facility] LEFT Left 2021     BREAST BIOPSY NEEDLE ADDITIONAL LEFT 2021 Tyler Crocker MD [facility]       Social History:    Social History     Tobacco Use    Smoking status: Former Smoker     Quit date: 1986     Years since quittin.0    Smokeless tobacco: Never Used   Substance Use Topics    Alcohol use:  Yes     Alcohol/week: 1.0 standard drink     Types: 1 Glasses of wine per week     Comment: daily                                 Counseling given: Not Answered      Vital Signs (Current):   Vitals:    21 0959 21 1005   BP: (!) 195/81    Pulse: 67    Resp: 18 Temp: 97.3 °F (36.3 °C)    SpO2: 97%    Weight:  140 lb (63.5 kg)   Height:  5' 1\" (1.549 m)                                              BP Readings from Last 3 Encounters:   12/09/21 (!) 195/81   11/15/21 122/67   10/23/18 138/70       NPO Status: Time of last liquid consumption: 2000                        Time of last solid consumption: 2000                        Date of last liquid consumption: 12/08/21                        Date of last solid food consumption: 12/08/21    BMI:   Wt Readings from Last 3 Encounters:   12/09/21 140 lb (63.5 kg)   11/15/21 138 lb (62.6 kg)   10/23/18 139 lb 9.6 oz (63.3 kg)     Body mass index is 26.45 kg/m². CBC:   Lab Results   Component Value Date    WBC 5.7 05/17/2018    RBC 4.40 05/17/2018    HGB 12.8 05/17/2018    HCT 37.7 05/17/2018    MCV 85.6 05/17/2018    RDW 14.3 05/17/2018     05/17/2018       CMP:   Lab Results   Component Value Date     05/17/2018    K 3.6 12/09/2021    K 3.8 05/17/2018    CL 98 05/17/2018    CO2 27 05/17/2018    BUN 18 05/17/2018    CREATININE 0.6 05/17/2018    LABGLOM >90 05/17/2018    GLUCOSE 95 05/17/2018    CALCIUM 9.4 05/17/2018       POC Tests: No results for input(s): POCGLU, POCNA, POCK, POCCL, POCBUN, POCHEMO, POCHCT in the last 72 hours.     Coags: No results found for: PROTIME, INR, APTT    HCG (If Applicable): No results found for: PREGTESTUR, PREGSERUM, HCG, HCGQUANT     ABGs: No results found for: PHART, PO2ART, YOG7JTV, NBU0KLK, BEART, X2TONEDA     Type & Screen (If Applicable):  Lab Results   Component Value Date    LABRH NEG 05/17/2018       Drug/Infectious Status (If Applicable):  No results found for: HIV, HEPCAB    COVID-19 Screening (If Applicable): No results found for: COVID19        Anesthesia Evaluation   no history of anesthetic complications:   Airway: Mallampati: II  TM distance: >3 FB   Neck ROM: full  Mouth opening: > = 3 FB Dental:          Pulmonary:Negative Pulmonary ROS and normal exam Patient did not smoke on day of surgery. Cardiovascular:    (+) hypertension:, hyperlipidemia                  Neuro/Psych:               GI/Hepatic/Renal:             Endo/Other: Negative Endo/Other ROS             Pt had no PAT visit       Abdominal:             Vascular: Other Findings:             Anesthesia Plan      MAC     ASA 3       Induction: intravenous. Anesthetic plan and risks discussed with patient. Plan discussed with CRNA.                   Vika Curtis MD   12/9/2021

## 2021-12-09 NOTE — OP NOTE
[address] [address]  Operative Report    PATIENT NAME: Guillermo Jimenez  MEDICAL RECORD NO. [id number]  SURGEON: Yoli Srinivasan MD MD FACS  Primary Care Physician: Myles Barton  Date: 12/9/2021, 11:35 AM     PROCEDURE PERFORMED: Left Excisional Biopsy with Needle Loc Prior  PREOPERATIVE DIAGNOSIS: Left    Active Hospital Problems    Diagnosis Date Noted    Papilloma of left breast [D24.2] 11/15/2021      POSTOPERATIVE DIAGNOSIS: Same, path pending  SURGEON:  Yoli Srinivasan MD MD FACS  ANESTHESIA:  Monitored Local Anesthesia with Sedation  ESTIMATED BLOOD LOSS:  0 ml  SPECIMEN: Breast Tissue  COMPLICATIONS:  None; patient tolerated the procedure well. DISPOSITION: Outpatient Surgery  CONDITION: stable      Indications: This patient presents with history of Left Mammographic abnormality      Procedure Details   The patient was seen in the Holding Area. The risks, benefits, complications, treatment options, and expected outcomes had been previously discussed with the patient. The possibilities of reaction to medication, pulmonary aspiration, bleeding, infection, the need for additional procedures, failure to diagnose a condition, and creating a complication requiring transfusion or operation were discussed with the patient. The patient concurred with the proposed plan, giving informed consent. The site of surgery properly noted/marked. The patient was brought to the operating room, placed supine on the operating room table, time, was taken, preoperative antibiotics were given, and signed consent on the chart. Sequential compression devices were in place. Previous needle localization had been performed. The breast was prepped and draped with ChloraPrep. After adequate sedation by anesthesia, and after adequate local anesthesia was induced, a transverse incision was made. The skin was incised with a 15 blade.  The excisional biopsy was performed by creating an oblique incision over the 12 o'clock of the breastaround the previously placed localization guidewire. . Following the wire down, the portion of the breast needing excision as identified by needle localization, was excised sharply with Metzenbaum scissors and electrocautery. Once the mass was excised, it was oriented for pathology with a margin map, placed on a grid and sent for x-ray confirmation. The biopsy site was then reinspected for hemostasis. Hemostasis was accomplished with electrocautery. . Once hemostasis was satisfactory, the wound was closed with 3-0 Vicryl for deep tissue, and 4-0 Vicryl running, subcuticular stitch for the skin. Skin affix glue was applied, and the patient brought back to outpatient in stable condition. At the end of the procedure, sponge and needle counts correct.  No apparent complications were noted.    '    Aric Barone MD, MD FACS  Electronically signed 12/9/2021 at 11:35 AM

## 2021-12-09 NOTE — PROGRESS NOTES
Contact Type: [facility]    Contact Information: Arrived with her  for needle localization. Having breast surgery today with Dr. Diaz Disla. Diagnosis: Papilloma     Notes: Procedure explained and questions addressed as needed. Dr. Sergio Killian placed wire. Secured with gauze and tape per protocol. Transported patient with belongings to Outpatient surgery via wheelchair at [address] [place] [address]

## 2021-12-09 NOTE — PROGRESS NOTES

## 2021-12-09 NOTE — H&P
[facility]  History and Physical Update    Pt Name: Nai Castro  MRN: [id number]  YOB: 1935  Date of evaluation: 12/9/2021    [x] I have examined the patient and reviewed the H&P/Consult and there are no changes to the patient or plans.     [] I have examined the patient and reviewed the H&P/Consult and have noted the following changes:        Yogi Chan MD  Electronically signed 12/9/2021 at 10:06 AM

## 2021-12-09 NOTE — ANESTHESIA POSTPROCEDURE EVALUATION
Department of Anesthesiology  Postprocedure Note    Patient: Josph Halsted  MRN: [id number]  YOB: 1935  Date of evaluation: 12/9/2021  Time:  2:27 PM     Procedure Summary     Date: 12/09/21 Room / Location: [address]    Anesthesia Start: 1110 Anesthesia Stop: [phone]    Procedure: LEFT LUMPECTOMY WITH ULTRASOUND GUIDED PREOP NEEDLE LOCALIZATION (Left Breast) Diagnosis: (LEFT BREAST PAPILLOMA)    Surgeons: Rocio Huerta MD Responsible Provider: Sasha Dunaway MD    Anesthesia Type: MAC ASA Status: 3          Anesthesia Type: MAC    Yulia Phase I: Yulia Score: 10    Yulia Phase II: Yulia Score: 10    Last vitals: Reviewed and per EMR flowsheets.        Anesthesia Post Evaluation    Patient location during evaluation: PACU  Patient participation: complete - patient participated  Level of consciousness: awake and alert  Airway patency: patent  Nausea & Vomiting: no nausea  Complications: no  Cardiovascular status: blood pressure returned to baseline and hemodynamically stable  Respiratory status: acceptable and spontaneous ventilation  Hydration status: euvolemic

## 2021-12-10 ENCOUNTER — TELEPHONE (OUTPATIENT)
Dept: SURGERY | Age: 86
End: 2021-12-10

## 2021-12-10 NOTE — TELEPHONE ENCOUNTER
Pt states that she is feeling well. She states that she is urinating with no issues; denies any n/v, or fevers. Incisions are clean, dry and intact. Pt advised to call the office with any questions or concerns.

## 2021-12-13 ENCOUNTER — TELEPHONE (OUTPATIENT)
Dept: SURGERY | Age: 86
End: 2021-12-13

## 2021-12-13 NOTE — TELEPHONE ENCOUNTER
----- Message from Angelito Murphy MD sent at 12/13/2021 11:27 AM EST -----  Let her know path was benign papilloma no cancer

## 2021-12-17 NOTE — PROGRESS NOTES
Mikey Roberts. Everett Montejo MD [facility]  General Surgery  Postprocedure Evaluation in Office  Pt Name: Alie Hernandez  Date of Birth 1935   Today's Date: 12/20/2021  Medical Record Number: [id number]  Referring Provider: No ref. provider found  Primary Care Provider: Bobby Marshall  Chief Complaint   Patient presents with   [employer] Post-Op Check     s/p left breast excisional biopsy, preop needle loc-12/09/21     ASSESSMENT      Problem List Items Addressed This Visit     S/P left breast biopsy - Primary                PLANS       1. Pathology reviewed with the patient who understands. All questions were answered. FINAL DIAGNOSIS:   Left breast, wire-guided lumpectomy:             Residual intraductal papilloma.             Duct ectasia and usual ductal hyperplasia.           Changes consistent with previous biopsy site.             Negative for carcinoma in situ and invasive malignancy  There are no Patient Instructions on file for this visit. 2. Follow up: Return if symptoms worsen or fail to improve. Orders Placed This Encounter:  No orders of the defined types were placed in this encounter. SUBJECTIVE   Nanette Waters [place] is seen today for post-op follow-up. She is 2 week(s) status post exc bx left breast with NLP Pathology was benign. . She is tolerating a regular diet, having regular bowel movements. Symptoms and activity have gradually improved compared to preoperative. The surgical site is clean and has no drainage. Pain is controlled without any medications. . She has compliant with postoperative instructions.   Past Medical History  Past Medical History:   Diagnosis Date    GERD (gastroesophageal reflux disease)     Hiatal hernia     Hyperlipidemia     Hypertension     Hypokalemia     Papilloma of left breast     TIA (transient ischemic attack) 2008     Past Surgical History  Past Surgical History:   Procedure Laterality Date    BREAST LUMPECTOMY Left 12/9/2021    LEFT LUMPECTOMY WITH ULTRASOUND GUIDED PREOP NEEDLE LOCALIZATION performed by Yogi Chan MD at [address] [place] [address].  2010    Dr. Springer Comes    age 38   [address] LEFT Left 11/01/2021    [address] LEFT 11/1/2021 Reinier Clifton  [facility] Bilateral 1993    age 38    [address] NEEDLE ADDITIONAL LEFT Left 11/01/2021    US BREAST BIOPSY NEEDLE ADDITIONAL LEFT 11/1/2021 Reinier Clifton MD [facility]    US GUIDED NEEDLE LOC OF LEFT BREAST Left 12/9/2021    US GUIDED NEEDLE LOC OF LEFT BREAST 12/9/2021 Reinier Clifton MD [facility]     Medications  Current Outpatient Medications on File Prior to Visit   Medication Sig Dispense Refill    Ascorbic Acid (VITAMIN C) 250 MG tablet Take 250 mg by mouth daily      sucralfate (CARAFATE) 1 GM tablet Take 1 g by mouth 2 times daily      calcium carbonate (OSCAL) 500 MG TABS tablet Take 500 mg by mouth daily      aspirin 81 MG EC tablet Take 81 mg by mouth daily.  omeprazole (PRILOSEC) 40 MG capsule Take 20 mg by mouth daily       hydrochlorothiazide (MICROZIDE) 12.5 MG capsule Take 12.5 mg by mouth every morning       metoprolol (LOPRESSOR) 50 MG tablet Take 50 mg by mouth 2 times daily.  amLODIPine (NORVASC) 5 MG tablet Take 5 mg by mouth daily.  clopidogrel (PLAVIX) 75 MG tablet Take 75 mg by mouth daily.  Multiple Vitamins-Minerals (MULTIVITAMIN PO) Take  by mouth daily. No current facility-administered medications on file prior to visit.      Allergies  Allergies   Allergen Reactions    Bicillin [Penicillin G Benzathine]      Social History  Social History     Socioeconomic History    Marital status:      Spouse name: Not on file    Number of children: Not on file    Years of education: Not on file    Highest education level: Not on file   Occupational History    Not on file   Tobacco Use    Smoking status: Former Smoker     Quit date: 1986     Years since quittin.0    Smokeless tobacco: Never Used   Vaping Use    Vaping Use: Never used   Substance and Sexual Activity    Alcohol use: Yes     Alcohol/week: 1.0 standard drink     Types: 1 Glasses of wine per week     Comment: daily     Drug use: No    Sexual activity: Not on file   Other Topics Concern    Not on file   Social History Narrative    Not on file     Social Determinants of Health     Financial Resource Strain:     Difficulty of Paying Living Expenses: Not on file   Food Insecurity:     Worried About Running Out of Food in the Last Year: Not on file    Kevin of Food in the Last Year: Not on file   Transportation Needs:     Lack of Transportation (Medical): Not on file    Lack of Transportation (Non-Medical):  Not on file   Physical Activity:     Days of Exercise per Week: Not on file    Minutes of Exercise per Session: Not on file   Stress:     Feeling of Stress : Not on file   Social Connections:     Frequency of Communication with Friends and Family: Not on file    Frequency of Social Gatherings with Friends and Family: Not on file    Attends Mosque Services: Not on file    Active Member of [address] [place] [address] [employer] or Organizations: Not on file    Attends Club or Organization Meetings: Not on file    Marital Status: Not on file   Intimate Partner Violence:     Fear of Current or Ex-Partner: Not on file    Emotionally Abused: Not on file    Physically Abused: Not on file    Sexually Abused: Not on file   Housing Stability:     Unable to Pay for Housing in the Last Year: Not on file    Number of [place] in the Last Year: Not on file    Unstable Housing in the Last Year: Not on [address] Maintenance   Topic Date Due    COVID-19 Vaccine (1) Never done    DTaP/Tdap/Td vaccine (1 - Tdap) Never done    DEXA (modify frequency per FRAX score)  Never done    Shingles Vaccine (2 of 3) 09/28/2017    Creatinine monitoring  05/17/2019    Annual Wellness Visit (AWV)  Never done    Flu vaccine (1) 09/01/2021    Potassium monitoring  12/09/2022    Pneumococcal 65+ years Vaccine  Completed    Hepatitis A vaccine  Aged Out    Hepatitis B vaccine  Aged Out    Hib vaccine  Aged Out    Meningococcal (ACWY) vaccine  Aged Out     Review of Systems  History obtained from the patient. Constitutional: Denies any fever, chills, fatigue. Wound: Denies any rash, skin color changes or wound problems. Resp: Denies any cough, shortness of breath. CV: Denies any chest pain, orthopnea or syncope. GI: Denies any nausea, vomiting, blood in the stool, constipation or diarrhea. OBJECTIVE      VITALS:  height is 5' 1\" (1.549 m) and weight is 140 lb (63.5 kg). Her tympanic temperature is 97.2 °F (36.2 °C). Her blood pressure is 124/67 and her pulse is 75. Her respiration is 15 and oxygen saturation is 98%. CONSTITUTIONAL: Alert and oriented times 3, no acute distress and cooperative to examination. SKIN: Skin color, texture, turgor normal. No rashes or lesions. INCISION: wound margins intact and healing well. No signs of infection. No drainage. NECK: Soft, trachea midline and straight  BREAST:Right lumpectomy scarNo evidence of seroma or hematoma. NEUROLOGIC: No sensory or motor nerve irritation  EXTREMITIES: no cyanosis, no clubbing and no edema. Stefania Rand.  Dagmar Fischer MD [facility]  12/20/2021  2:05 PM

## 2021-12-20 ENCOUNTER — OFFICE VISIT (OUTPATIENT)
Dept: SURGERY | Age: 86
End: 2021-12-20

## 2021-12-20 VITALS
HEIGHT: 61 IN | DIASTOLIC BLOOD PRESSURE: 67 MMHG | OXYGEN SATURATION: 98 % | HEART RATE: 75 BPM | TEMPERATURE: 97.2 F | WEIGHT: 140 LBS | RESPIRATION RATE: 15 BRPM | SYSTOLIC BLOOD PRESSURE: 124 MMHG | BODY MASS INDEX: 26.43 KG/M2

## 2021-12-20 DIAGNOSIS — Z98.890 S/P LEFT BREAST BIOPSY: Primary | ICD-10-CM

## 2021-12-20 PROCEDURE — 99024 POSTOP FOLLOW-UP VISIT: CPT | Performed by: SURGERY

## 2021-12-20 NOTE — LETTER
[facility]  Geovanna Mercer MD FACS  Phone- [phone]  Fax [phone]    Pt Name: Anthony Landaverde  Medical Record Number: [id number]  Date of Birth 1935   Today's Date: 12/20/2021    Audra Cowden was evaluated in the office today. My assessment and plans are listed below. Assessment:     Jennifer Kaiser was seen today for post-op check. Diagnoses and all orders for this visit:    S/P left breast biopsy         Plan:  1. Pathology reviewed with the patient who understands. All questions were answered. FINAL DIAGNOSIS:   Left breast, wire-guided lumpectomy:             Residual intraductal papilloma.             Duct ectasia and usual ductal hyperplasia.           Changes consistent with previous biopsy site.             Negative for carcinoma in situ and invasive malignancy  There are no Patient Instructions on file for this visit. 2. Follow up: Return if symptoms worsen or fail to improve. If I can provide any additional assistance or you have any concerns, please feel free to contact me. Thank you for allowing to participate in the care of your patients. Sincerely,      Geovanna Mercer MD FACS  [address]  [place], [address]  Office: [phone]  Fax: [phone]

## 2022-11-16 ENCOUNTER — HOSPITAL ENCOUNTER (OUTPATIENT)
Dept: WOMENS IMAGING | Age: 87
Discharge: HOME | End: 2022-11-16
Payer: MEDICARE

## 2022-11-16 DIAGNOSIS — Z12.31 VISIT FOR SCREENING MAMMOGRAM: ICD-10-CM

## 2022-11-16 PROCEDURE — 77063 BREAST TOMOSYNTHESIS BI: CPT

## 2024-01-23 ENCOUNTER — HOSPITAL ENCOUNTER (OUTPATIENT)
Dept: MAMMOGRAPHY | Age: 89
Discharge: HOME | End: 2024-01-23
Payer: MEDICARE

## 2024-01-23 VITALS — BODY MASS INDEX: 24.73 KG/M2 | HEIGHT: 61 IN | WEIGHT: 131 LBS

## 2024-01-23 DIAGNOSIS — Z12.31 VISIT FOR SCREENING MAMMOGRAM: ICD-10-CM

## 2024-01-23 PROCEDURE — 77063 BREAST TOMOSYNTHESIS BI: CPT

## 2025-02-06 ENCOUNTER — HOSPITAL ENCOUNTER (OUTPATIENT)
Dept: MAMMOGRAPHY | Age: 89
Discharge: HOME | End: 2025-02-06
Payer: MEDICARE

## 2025-02-06 DIAGNOSIS — Z12.31 VISIT FOR SCREENING MAMMOGRAM: ICD-10-CM

## 2025-02-06 DIAGNOSIS — Z12.31 ENCOUNTER FOR SCREENING MAMMOGRAM FOR MALIGNANT NEOPLASM OF BREAST: ICD-10-CM

## 2025-02-06 PROCEDURE — 77063 BREAST TOMOSYNTHESIS BI: CPT

## (undated) DEVICE — PENCIL SMK EVAC 15FT BLADE ELECTRD ROCKER F/TELSCP

## (undated) DEVICE — SUTURE PERMA-HAND SZ 2-0 L30IN NONABSORBABLE BLK L26MM SH K833H

## (undated) DEVICE — BREAST HERNIA PACK: Brand: MEDLINE INDUSTRIES, INC.

## (undated) DEVICE — APPLIER LIG CLP M L11IN TI STR RNG HNDL FOR 20 CLP DISP

## (undated) DEVICE — GLOVE ORANGE PI 8   MSG9080

## (undated) DEVICE — GLOVE ORANGE PI 7 1/2   MSG9075

## (undated) DEVICE — ADHESIVE SKIN CLSR 0.7ML TOP DERMBND ADV

## (undated) DEVICE — APPLICATOR PREP 26ML 0.7% IOD POVACRYLEX 74% ISO ALC ST

## (undated) DEVICE — SUTURE VCRL + SZ 4-0 L27IN ABSRB WHT FS-2 3/8 CIR REV CUT VCP422H

## (undated) DEVICE — SUTURE VCRL + SZ 3-0 L27IN ABSRB UD L26MM SH 1/2 CIR VCP416H

## (undated) DEVICE — SPECIMEN ORIENTATION CHARMS, SIX DISTINCTLY SHAPED STERILE 10MM CHARMS: Brand: MARGINMAP